# Patient Record
Sex: MALE | Race: BLACK OR AFRICAN AMERICAN | NOT HISPANIC OR LATINO | Employment: FULL TIME | URBAN - METROPOLITAN AREA
[De-identification: names, ages, dates, MRNs, and addresses within clinical notes are randomized per-mention and may not be internally consistent; named-entity substitution may affect disease eponyms.]

---

## 2017-08-12 ENCOUNTER — HOSPITAL ENCOUNTER (EMERGENCY)
Facility: HOSPITAL | Age: 37
Discharge: HOME | End: 2017-08-12
Attending: EMERGENCY MEDICINE

## 2017-08-12 VITALS
HEIGHT: 67 IN | SYSTOLIC BLOOD PRESSURE: 126 MMHG | RESPIRATION RATE: 16 BRPM | BODY MASS INDEX: 27.47 KG/M2 | WEIGHT: 175 LBS | TEMPERATURE: 98 F | DIASTOLIC BLOOD PRESSURE: 82 MMHG | OXYGEN SATURATION: 99 % | HEART RATE: 90 BPM

## 2017-08-12 DIAGNOSIS — R55 SYNCOPE, UNSPECIFIED SYNCOPE TYPE: ICD-10-CM

## 2017-08-12 DIAGNOSIS — R55 SYNCOPE: ICD-10-CM

## 2017-08-12 DIAGNOSIS — F43.21 GRIEF REACTION: Primary | ICD-10-CM

## 2017-08-12 PROCEDURE — 93005 ELECTROCARDIOGRAM TRACING: CPT

## 2017-08-12 PROCEDURE — 25000003 PHARM REV CODE 250: Performed by: EMERGENCY MEDICINE

## 2017-08-12 PROCEDURE — 99284 EMERGENCY DEPT VISIT MOD MDM: CPT

## 2017-08-12 RX ORDER — ALPRAZOLAM 0.25 MG/1
0.5 TABLET ORAL
Status: COMPLETED | OUTPATIENT
Start: 2017-08-12 | End: 2017-08-12

## 2017-08-12 RX ORDER — ALPRAZOLAM 0.5 MG/1
0.5 TABLET ORAL 3 TIMES DAILY PRN
Qty: 12 TABLET | Refills: 0 | Status: SHIPPED | OUTPATIENT
Start: 2017-08-12 | End: 2017-09-11

## 2017-08-12 RX ADMIN — ALPRAZOLAM 0.5 MG: 0.25 TABLET ORAL at 03:08

## 2017-08-12 NOTE — ED PROVIDER NOTES
"Encounter Date: 8/12/2017       History     Chief Complaint   Patient presents with    Anxiety     "passed out " after recieving news of brothers death     37-year-old healthy male presents to the emergency room after an episode of syncope today.  He had just been informed of his brother's unexpected death in a car accident when he fainted.  No other complaints.  No chest pain preceding this event.  Patient has no significant medical history.  Doesn't smoke doesn't drink.          Review of patient's allergies indicates:  No Known Allergies  History reviewed. No pertinent past medical history.  History reviewed. No pertinent surgical history.  History reviewed. No pertinent family history.  Social History   Substance Use Topics    Smoking status: Never Smoker    Smokeless tobacco: Not on file    Alcohol use Not on file     Review of Systems   Respiratory: Negative for shortness of breath.    Cardiovascular: Negative for chest pain.   Skin: Negative for rash.   Neurological: Positive for syncope. Negative for headaches.       Physical Exam     Initial Vitals [08/12/17 1519]   BP Pulse Resp Temp SpO2   129/83 93 20 98 °F (36.7 °C) 96 %      MAP       98.33         Physical Exam    Nursing note and vitals reviewed.  Constitutional: He appears well-developed and well-nourished. He is not diaphoretic.  Non-toxic appearance. He does not have a sickly appearance. He does not appear ill. No distress.   HENT:   Head: Normocephalic and atraumatic.   Eyes: EOM are normal.   Neck: Normal range of motion. Neck supple. Normal range of motion present. No neck rigidity.   Cardiovascular: Normal rate, regular rhythm and normal heart sounds.   Pulmonary/Chest: Breath sounds normal. No respiratory distress.   Abdominal: Soft. He exhibits no distension. There is no tenderness. There is no rebound.   Musculoskeletal: Normal range of motion.   Neurological: He is alert and oriented to person, place, and time.   Skin: Skin is warm and " dry. No rash noted.   Psychiatric: He has a normal mood and affect. His behavior is normal. Judgment and thought content normal.         ED Course   Procedures  Labs Reviewed - No data to display          Medical Decision Making:   Clinical Tests:   Medical Tests: Ordered and Reviewed                   ED Course   Comment By Time   37-year-old with no medical history presents after an episode of syncope when he was informed that his brother had unexpectedly .  Feels better after Xanax, EKG with no acute findings.  No labs are indicated.  Diagnosis is a grief reaction, patient can be discharged. Ahmet Pineda MD  [id number]     Clinical Impression:   The primary encounter diagnosis was Grief reaction. Diagnoses of Syncope and Syncope, unspecified syncope type were also pertinent to this visit.                           Ahmet Pineda MD  [phone]

## 2018-03-02 ENCOUNTER — CLINICAL SUPPORT (OUTPATIENT)
Dept: OCCUPATIONAL MEDICINE | Facility: CLINIC | Age: 38
End: 2018-03-02

## 2018-03-02 DIAGNOSIS — Z00.00 PHYSICAL EXAM: Primary | ICD-10-CM

## 2018-03-02 LAB
BILIRUB UR QL STRIP: NORMAL
CTP QC/QA: YES
GLUCOSE UR QL STRIP: NORMAL
KETONES UR QL STRIP: NORMAL
LEUKOCYTE ESTERASE UR QL STRIP: NORMAL
PH, POC UA: NORMAL (ref 5–8)
POC 10 PANEL DRUG SCREEN: NEGATIVE
POC BLOOD, URINE: NEGATIVE
POC NITRATES, URINE: NORMAL
PROT UR QL STRIP: NORMAL
SP GR UR STRIP: NORMAL (ref 1–1.03)
UROBILINOGEN UR STRIP-ACNC: NORMAL (ref 0.3–2.2)

## 2018-03-02 PROCEDURE — 99499 UNLISTED E&M SERVICE: CPT | Mod: S$GLB,,, | Performed by: PREVENTIVE MEDICINE

## 2018-03-02 PROCEDURE — 80305 DRUG TEST PRSMV DIR OPT OBS: CPT | Mod: QW,S$GLB,, | Performed by: PHYSICIAN ASSISTANT

## 2021-10-25 ENCOUNTER — OCCUPATIONAL HEALTH (OUTPATIENT)
Dept: URGENT CARE | Facility: CLINIC | Age: 41
End: 2021-10-25
Payer: OTHER MISCELLANEOUS

## 2021-10-25 DIAGNOSIS — Z02.1 PRE-EMPLOYMENT DRUG SCREENING: Primary | ICD-10-CM

## 2021-10-25 LAB
CTP QC/QA: YES
POC 10 PANEL DRUG SCREEN: NEGATIVE

## 2021-10-25 PROCEDURE — 80305 POCT RAPID DRUG SCREEN 10 PANEL: ICD-10-PCS | Mod: S$GLB,,, | Performed by: NURSE PRACTITIONER

## 2021-10-25 PROCEDURE — 80305 DRUG TEST PRSMV DIR OPT OBS: CPT | Mod: S$GLB,,, | Performed by: NURSE PRACTITIONER

## 2021-10-26 ENCOUNTER — OCCUPATIONAL HEALTH (OUTPATIENT)
Dept: URGENT CARE | Facility: CLINIC | Age: 41
End: 2021-10-26

## 2021-10-26 DIAGNOSIS — Z02.83 ENCOUNTER FOR DRUG SCREENING: Primary | ICD-10-CM

## 2021-10-26 LAB — BREATH ALCOHOL: 0

## 2021-10-26 PROCEDURE — 82075 POCT BREATH ALCOHOL TEST: ICD-10-PCS | Mod: S$GLB,,, | Performed by: NURSE PRACTITIONER

## 2021-10-26 PROCEDURE — 82075 ASSAY OF BREATH ETHANOL: CPT | Mod: S$GLB,,, | Performed by: NURSE PRACTITIONER

## 2025-06-08 ENCOUNTER — HOSPITAL ENCOUNTER (OUTPATIENT)
Facility: HOSPITAL | Age: 45
Discharge: HOME | End: 2025-06-10
Attending: HOSPITALIST | Admitting: HOSPITALIST
Payer: COMMERCIAL

## 2025-06-08 DIAGNOSIS — N17.9 AKI (ACUTE KIDNEY INJURY): ICD-10-CM

## 2025-06-08 DIAGNOSIS — R07.9 CHEST PAIN: ICD-10-CM

## 2025-06-08 PROBLEM — K21.9 GERD (GASTROESOPHAGEAL REFLUX DISEASE): Status: ACTIVE | Noted: 2025-06-08

## 2025-06-08 PROBLEM — I10 HYPERTENSION: Status: ACTIVE | Noted: 2025-06-08

## 2025-06-08 LAB
ABSOLUTE EOSINOPHIL (OHS): 0.01 K/UL
ABSOLUTE MONOCYTE (OHS): 0.74 K/UL (ref 0.3–1)
ABSOLUTE NEUTROPHIL COUNT (OHS): 2.76 K/UL (ref 1.8–7.7)
ALBUMIN SERPL BCP-MCNC: 3.9 G/DL (ref 3.5–5.2)
ALP SERPL-CCNC: 70 UNIT/L (ref 40–150)
ALT SERPL W/O P-5'-P-CCNC: 17 UNIT/L (ref 10–44)
ANION GAP (OHS): 13 MMOL/L (ref 8–16)
AST SERPL-CCNC: 24 UNIT/L (ref 11–45)
BASOPHILS # BLD AUTO: 0.02 K/UL
BASOPHILS NFR BLD AUTO: 0.4 %
BILIRUB SERPL-MCNC: 0.6 MG/DL (ref 0.1–1)
BUN SERPL-MCNC: 37 MG/DL (ref 6–20)
CALCIUM SERPL-MCNC: 8.9 MG/DL (ref 8.7–10.5)
CHLORIDE SERPL-SCNC: 100 MMOL/L (ref 95–110)
CK SERPL-CCNC: 300 U/L (ref 20–200)
CO2 SERPL-SCNC: 25 MMOL/L (ref 23–29)
CREAT SERPL-MCNC: 2.8 MG/DL (ref 0.5–1.4)
ERYTHROCYTE [DISTWIDTH] IN BLOOD BY AUTOMATED COUNT: 15 % (ref 11.5–14.5)
GFR SERPLBLD CREATININE-BSD FMLA CKD-EPI: 28 ML/MIN/1.73/M2
GLUCOSE SERPL-MCNC: 93 MG/DL (ref 70–110)
HCT VFR BLD AUTO: 47.3 % (ref 40–54)
HGB BLD-MCNC: 15.3 GM/DL (ref 14–18)
IMM GRANULOCYTES # BLD AUTO: 0.01 K/UL (ref 0–0.04)
IMM GRANULOCYTES NFR BLD AUTO: 0.2 % (ref 0–0.5)
LYMPHOCYTES # BLD AUTO: 1.19 K/UL (ref 1–4.8)
MCH RBC QN AUTO: 26.1 PG (ref 27–31)
MCHC RBC AUTO-ENTMCNC: 32.3 G/DL (ref 32–36)
MCV RBC AUTO: 81 FL (ref 82–98)
NUCLEATED RBC (/100WBC) (OHS): 0 /100 WBC
PLATELET # BLD AUTO: 176 K/UL (ref 150–450)
PMV BLD AUTO: 10.7 FL (ref 9.2–12.9)
POTASSIUM SERPL-SCNC: 3.8 MMOL/L (ref 3.5–5.1)
PROT SERPL-MCNC: 7.5 GM/DL (ref 6–8.4)
RBC # BLD AUTO: 5.86 M/UL (ref 4.6–6.2)
RELATIVE EOSINOPHIL (OHS): 0.2 %
RELATIVE LYMPHOCYTE (OHS): 25.2 % (ref 18–48)
RELATIVE MONOCYTE (OHS): 15.6 % (ref 4–15)
RELATIVE NEUTROPHIL (OHS): 58.4 % (ref 38–73)
SODIUM SERPL-SCNC: 138 MMOL/L (ref 136–145)
WBC # BLD AUTO: 4.73 K/UL (ref 3.9–12.7)

## 2025-06-08 PROCEDURE — G0378 HOSPITAL OBSERVATION PER HR: HCPCS

## 2025-06-08 PROCEDURE — G0379 DIRECT REFER HOSPITAL OBSERV: HCPCS

## 2025-06-08 PROCEDURE — 96361 HYDRATE IV INFUSION ADD-ON: CPT

## 2025-06-08 PROCEDURE — 85025 COMPLETE CBC W/AUTO DIFF WBC: CPT

## 2025-06-08 PROCEDURE — 63600175 PHARM REV CODE 636 W HCPCS

## 2025-06-08 PROCEDURE — 96360 HYDRATION IV INFUSION INIT: CPT

## 2025-06-08 PROCEDURE — 82550 ASSAY OF CK (CPK): CPT

## 2025-06-08 PROCEDURE — 25000003 PHARM REV CODE 250

## 2025-06-08 PROCEDURE — 80053 COMPREHEN METABOLIC PANEL: CPT

## 2025-06-08 RX ORDER — NALOXONE HCL 0.4 MG/ML
0.02 VIAL (ML) INJECTION
Status: DISCONTINUED | OUTPATIENT
Start: 2025-06-08 | End: 2025-06-10 | Stop reason: HOSPADM

## 2025-06-08 RX ORDER — SODIUM CHLORIDE, SODIUM LACTATE, POTASSIUM CHLORIDE, CALCIUM CHLORIDE 600; 310; 30; 20 MG/100ML; MG/100ML; MG/100ML; MG/100ML
INJECTION, SOLUTION INTRAVENOUS CONTINUOUS
Status: ACTIVE | OUTPATIENT
Start: 2025-06-08 | End: 2025-06-08

## 2025-06-08 RX ORDER — IBUPROFEN 200 MG
24 TABLET ORAL
Status: DISCONTINUED | OUTPATIENT
Start: 2025-06-08 | End: 2025-06-10 | Stop reason: HOSPADM

## 2025-06-08 RX ORDER — ONDANSETRON 8 MG/1
8 TABLET, ORALLY DISINTEGRATING ORAL EVERY 8 HOURS PRN
Status: DISCONTINUED | OUTPATIENT
Start: 2025-06-08 | End: 2025-06-10 | Stop reason: HOSPADM

## 2025-06-08 RX ORDER — AMLODIPINE BESYLATE 10 MG/1
10 TABLET ORAL DAILY
Status: DISCONTINUED | OUTPATIENT
Start: 2025-06-08 | End: 2025-06-10 | Stop reason: HOSPADM

## 2025-06-08 RX ORDER — TALC
6 POWDER (GRAM) TOPICAL NIGHTLY PRN
Status: DISCONTINUED | OUTPATIENT
Start: 2025-06-08 | End: 2025-06-10 | Stop reason: HOSPADM

## 2025-06-08 RX ORDER — PANTOPRAZOLE SODIUM 20 MG/1
20 TABLET, DELAYED RELEASE ORAL EVERY MORNING
COMMUNITY
Start: 2025-06-06 | End: 2026-06-06

## 2025-06-08 RX ORDER — OXYCODONE HYDROCHLORIDE 5 MG/1
5 TABLET ORAL EVERY 6 HOURS PRN
Refills: 0 | Status: DISCONTINUED | OUTPATIENT
Start: 2025-06-08 | End: 2025-06-10 | Stop reason: HOSPADM

## 2025-06-08 RX ORDER — PROCHLORPERAZINE EDISYLATE 5 MG/ML
5 INJECTION INTRAMUSCULAR; INTRAVENOUS EVERY 6 HOURS PRN
Status: DISCONTINUED | OUTPATIENT
Start: 2025-06-08 | End: 2025-06-10 | Stop reason: HOSPADM

## 2025-06-08 RX ORDER — ACETAMINOPHEN 500 MG
1000 TABLET ORAL EVERY 8 HOURS PRN
Status: DISCONTINUED | OUTPATIENT
Start: 2025-06-08 | End: 2025-06-09

## 2025-06-08 RX ORDER — GLUCAGON 1 MG
1 KIT INJECTION
Status: DISCONTINUED | OUTPATIENT
Start: 2025-06-08 | End: 2025-06-10 | Stop reason: HOSPADM

## 2025-06-08 RX ORDER — POLYETHYLENE GLYCOL 3350 17 G/17G
17 POWDER, FOR SOLUTION ORAL DAILY PRN
Status: DISCONTINUED | OUTPATIENT
Start: 2025-06-08 | End: 2025-06-10 | Stop reason: HOSPADM

## 2025-06-08 RX ORDER — IBUPROFEN 200 MG
16 TABLET ORAL
Status: DISCONTINUED | OUTPATIENT
Start: 2025-06-08 | End: 2025-06-10 | Stop reason: HOSPADM

## 2025-06-08 RX ORDER — SODIUM CHLORIDE 0.9 % (FLUSH) 0.9 %
10 SYRINGE (ML) INJECTION EVERY 12 HOURS PRN
Status: DISCONTINUED | OUTPATIENT
Start: 2025-06-08 | End: 2025-06-10 | Stop reason: HOSPADM

## 2025-06-08 RX ORDER — LOSARTAN POTASSIUM AND HYDROCHLOROTHIAZIDE 25; 100 MG/1; MG/1
1 TABLET ORAL DAILY
COMMUNITY

## 2025-06-08 RX ORDER — ACETAMINOPHEN 325 MG/1
650 TABLET ORAL EVERY 4 HOURS PRN
Status: DISCONTINUED | OUTPATIENT
Start: 2025-06-08 | End: 2025-06-10 | Stop reason: HOSPADM

## 2025-06-08 RX ORDER — LIDOCAINE 50 MG/G
2 PATCH TOPICAL
Status: DISCONTINUED | OUTPATIENT
Start: 2025-06-08 | End: 2025-06-10 | Stop reason: HOSPADM

## 2025-06-08 RX ORDER — AMLODIPINE BESYLATE 10 MG/1
10 TABLET ORAL DAILY
COMMUNITY

## 2025-06-08 RX ADMIN — LIDOCAINE 2 PATCH: 50 PATCH CUTANEOUS at 10:06

## 2025-06-08 RX ADMIN — AMLODIPINE BESYLATE 10 MG: 10 TABLET ORAL at 10:06

## 2025-06-08 RX ADMIN — ACETAMINOPHEN 650 MG: 325 TABLET ORAL at 10:06

## 2025-06-08 RX ADMIN — OXYCODONE 5 MG: 5 TABLET ORAL at 09:06

## 2025-06-08 RX ADMIN — SODIUM CHLORIDE, POTASSIUM CHLORIDE, SODIUM LACTATE AND CALCIUM CHLORIDE: 600; 310; 30; 20 INJECTION, SOLUTION INTRAVENOUS at 10:06

## 2025-06-08 RX ADMIN — OXYCODONE 5 MG: 5 TABLET ORAL at 01:06

## 2025-06-08 RX ADMIN — Medication 6 MG: at 09:06

## 2025-06-08 RX ADMIN — ONDANSETRON 8 MG: 8 TABLET, ORALLY DISINTEGRATING ORAL at 09:06

## 2025-06-08 NOTE — PLAN OF CARE
[facility] - Med Surg  Initial Discharge Assessment       Primary Care Provider: Renata, Primary Doctor    Admission Diagnosis: FUNMILAYO (acute kidney injury) [N17.9]    Admission Date: 6/8/2025  Expected Discharge Date:     Transition of Care Barriers: None    Payor: /     Extended Emergency Contact Information  Primary Emergency Contact: Monroe Wang   [facility]  Home Phone: [phone]  Mobile Phone: [phone]  Relation: Brother    Discharge Plan A: Home  Discharge Plan B: Home      [id number] - [place], [place] - [address] AT [address]  [address]  [facility] [address]  Phone: [phone] Fax: [phone]      Initial Assessment (most recent)       Adult Discharge Assessment - 06/08/25 1344          Discharge Assessment    Assessment Type Discharge Planning Assessment     Confirmed/corrected address, phone number and insurance Yes     Confirmed Demographics Correct on Facesheet     Source of Information patient     Communicated LU with patient/caregiver Date not available/Unable to determine     People in Home alone     Facility Arrived From: [facility]     Do you expect to return to your current living situation? Yes     Do you have help at home or someone to help you manage your care at home? No     Prior to hospitilization cognitive status: Alert/Oriented     Current cognitive status: Alert/Oriented     Walking or Climbing Stairs Difficulty no     Dressing/Bathing Difficulty no     Home Accessibility wheelchair accessible     Home Layout Able to live on 1st floor     Equipment Currently Used at Home none     Readmission within 30 days? No     Patient currently being followed by outpatient case management? No     Do you currently have service(s) that help you manage your care at home? No     Do you take prescription medications? Yes     Do you have prescription coverage? Yes     Do you have any problems affording any of your  prescribed medications? TBD     Who is going to help you get home at discharge? family     How do you get to doctors appointments? car, drives self;family or friend will provide     Are you on dialysis? No     Do you take coumadin? No     Discharge Plan A Home     Discharge Plan B Home     DME Needed Upon Discharge  none     Discharge Plan discussed with: Patient     Transition of Care Barriers None        Physical Activity    On average, how many days per week do you engage in moderate to strenuous exercise (like a brisk walk)? 5 days        Financial Resource Strain    How hard is it for you to pay for the very basics like food, housing, medical care, and heating? Somewhat hard        Housing Stability    In the last 12 months, was there a time when you were not able to pay the mortgage or rent on time? No     At any time in the past 12 months, were you homeless or living in a shelter (including now)? No        Transportation Needs    In the past 12 months, has lack of transportation kept you from medical appointments or from getting medications? No     In the past 12 months, has lack of transportation kept you from meetings, work, or from getting things needed for daily living? No        Food Insecurity    Within the past 12 months, you worried that your food would run out before you got the money to buy more. Never true     Within the past 12 months, the food you bought just didn't last and you didn't have money to get more. Never true        Stress    Do you feel stress - tense, restless, nervous, or anxious, or unable to sleep at night because your mind is troubled all the time - these days? Only a little        Social Isolation    How often do you feel lonely or isolated from those around you?  Rarely        Alcohol Use    Q2: How many drinks containing alcohol do you have on a typical day when you are drinking? Patient does not drink        Health Literacy    How often do you need to have someone help you  when you read instructions, pamphlets, or other written material from your doctor or pharmacy? Rarely                        Discharge Plan A and Plan B have been determined by review of patient's clinical status, future medical and therapeutic needs, and coverage/benefits for post-acute care in coordination with multidisciplinary team members.     CM spoke with patient in Room at bedside. All information was verified on facesheet. Patient lives in a 1 story house alone. Patient states no assistance is needed. Patient can ambulate, drive, run errands, and complete ADL's independently. Patient does not use any DME or any in-home assistive equipment. Patient has not used Home Health previously. Patient is not on dialysis nor use Coumadin as a blood thinner. Patient takes medication as prescribed and has resources for all prescriptive needs. Patient will have help from family member upon discharge. Family will provide transportation home. All Questions and concerns addressed and whiteboard updated with CM contact information. Will continue to follow for course of hospitalization.     Shante Hamilton RN  Case Management  [phone]

## 2025-06-08 NOTE — H&P
[facility]  History & Physical    Patient Name: Samantha Wang  MRN: [id number]  Patient Class: OP- Observation  Admission Date: 6/8/2025  Attending Physician: Luis Morales DO   Primary Care Provider: Renata, Primary Doctor         Patient information was obtained from patient, past medical records, and ER records.     Subjective:     Principal Problem:FUNMILAYO (acute kidney injury)    Chief Complaint: No chief complaint on file.       HPI: Samantha Wang is a 45 y.o.M with no significant PMH. He presented with weakness, fever, malaise, nausea, vomiting, and abdominal pain. Patient works outside in the heat and has been trying to keep hydrated. He was seen on [facility] [place] over the past two days after presenting with chest pain. Work up was largely negative and patient reported feeling improved and was discharged. He is concerned that something else may be going on. Tmax in the ER was 100. Work up with CT showed thickening of the urinary bladder. DDD causing stenosis of L5-S1.  Creatinine noted to be at 4. Cr at baseline 2.7 in 2022 and recently 1.6 on 6/5/25. Patient receiving IV hydration. Urine negative for UTI. Transferring for management of FUNMILAYO.     On arrival to [facility] [place], patient states he has been feeling very week for the last two days with associated vomiting since Thursday. Patient states he hasn't vomited since yesterday and was able to keep some cereal down earlier this morning. He is also endorsing low back pain. 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 He tries to stay hydrated most of the time but admits it can be difficult. Currently denies fever, chills, chest pain, palpitations, SOB, cough, dysuria, headaches, or any other symptoms at this time.       No past medical history on file.    No past surgical history on file.    Review of patient's allergies indicates:  No Known Allergies    Current Facility-Administered Medications on File Prior  to Encounter   Medication    [COMPLETED] acetaminophen tablet 1,000 mg    [COMPLETED] iohexoL (OMNIPAQUE 350) injection 100 mL    [COMPLETED] ondansetron injection 4 mg    [COMPLETED] sodium chloride 0.9% bolus 1,000 mL 1,000 mL    [COMPLETED] sodium chloride 0.9% bolus 1,000 mL 1,000 mL     Current Outpatient Medications on File Prior to Encounter   Medication Sig    pantoprazole (PROTONIX) 20 MG tablet Take 20 mg by mouth every morning.    alprazolam (XANAX) 0.5 MG tablet Take 1 tablet (0.5 mg total) by mouth 3 (three) times daily as needed for Anxiety.    amLODIPine (NORVASC) 10 MG tablet Take 10 mg by mouth once daily.    ketorolac (TORADOL) 10 mg tablet Take 1 tablet (10 mg total) by mouth every 6 (six) hours as needed for Pain.    lidocaine (LIDODERM) 5 % Place 1 patch onto the skin once daily. Remove & Discard patch within 12 hours or as directed by MD    losartan-hydrochlorothiazide 100-25 mg (HYZAAR) 100-25 mg per tablet Take 1 tablet by mouth once daily.     Family History    None       Tobacco Use    Smoking status: Never    Smokeless tobacco: Not on file   Vaping Use    Vaping status: Never Used   Substance and Sexual Activity    Alcohol use: Not Currently    Drug use: Not Currently    Sexual activity: Yes     Review of Systems   Constitutional:  Positive for fatigue. Negative for activity change, chills and fever.   HENT:  Negative for trouble swallowing.    Eyes:  Negative for photophobia and visual disturbance.   Respiratory:  Negative for chest tightness, shortness of breath and wheezing.    Cardiovascular:  Negative for chest pain, palpitations and leg swelling.   Gastrointestinal:  Positive for vomiting. Negative for abdominal pain, constipation and diarrhea.   Genitourinary:  Negative for dysuria, frequency, hematuria and urgency.   Musculoskeletal:  Positive for back pain. Negative for arthralgias and gait problem.   Skin:  Negative for color change and rash.   Neurological:  Positive for  weakness. Negative for dizziness, syncope, light-headedness, numbness and headaches.   Psychiatric/Behavioral:  Negative for agitation and confusion. The patient is not nervous/anxious.      Objective:     Vital Signs (Most Recent):  Temp: 99.5 °F (37.5 °C) (06/08/25 0818)  Pulse: 92 (06/08/25 0818)  Resp: 16 (06/08/25 0818)  BP: (!) 141/82 (06/08/25 0818)  SpO2: 95 % (06/08/25 0818) Vital Signs (24h Range):  Temp:  [98.9 °F (37.2 °C)-100.1 °F (37.8 °C)] 99.5 °F (37.5 °C)  Pulse:  [] 92  Resp:  [16-22] 16  SpO2:  [95 %-99 %] 95 %  BP: (112-156)/(62-87) 141/82        There is no height or weight on file to calculate BMI.     Physical Exam  Vitals and nursing note reviewed.   Constitutional:       General: He is not in acute distress.     Appearance: He is well-developed.   HENT:      Head: Normocephalic and atraumatic.      Mouth/Throat:      Pharynx: No oropharyngeal exudate.   Eyes:      Extraocular Movements: Extraocular movements intact.      Conjunctiva/sclera: Conjunctivae normal.   Cardiovascular:      Rate and Rhythm: Normal rate and regular rhythm.      Heart sounds: Normal heart sounds.   Pulmonary:      Effort: Pulmonary effort is normal. No respiratory distress.      Breath sounds: Normal breath sounds. No wheezing.   Abdominal:      General: Bowel sounds are normal. There is no distension.      Palpations: Abdomen is soft.      Tenderness: There is no abdominal tenderness.   Musculoskeletal:         General: Tenderness (low back) present. Normal range of motion.      Cervical back: Normal range of motion and neck supple.   Lymphadenopathy:      Cervical: No cervical adenopathy.   Skin:     General: Skin is warm and dry.      Capillary Refill: Capillary refill takes less than 2 seconds.      Findings: No rash.   Neurological:      Mental Status: He is alert and oriented to person, place, and time.      Cranial Nerves: No cranial nerve deficit.      Sensory: No sensory deficit.      Coordination:  Coordination normal.   Psychiatric:         Behavior: Behavior normal.         Thought Content: Thought content normal.         Judgment: Judgment normal.                Significant Labs: All pertinent labs within the past 24 hours have been reviewed.    Bilirubin:   Recent Labs   Lab 06/07/25 1810   BILITOT 0.7     BMP:   Recent Labs   Lab 06/07/25  1810   *      K 3.9   CL 99   CO2 21*   BUN 43*   CREATININE 4.0*   CALCIUM 8.8     CBC:   Recent Labs   Lab 06/07/25 1810   WBC 5.79   HGB 14.9   HCT 43.8        CMP:   Recent Labs   Lab 06/07/25  1810      K 3.9   CL 99   CO2 21*   *   BUN 43*   CREATININE 4.0*   CALCIUM 8.8   PROT 8.1   ALBUMIN 4.1   BILITOT 0.7   ALKPHOS 67   AST 36   ALT 19   ANIONGAP 16     Lipase:   Recent Labs   Lab 06/07/25 1810   LIPASE 20     Urine Studies:   Recent Labs   Lab 06/07/25 2010   COLORU Yellow   APPEARANCEUA Clear   PHUR 6.0   SPECGRAV 1.025   PROTEINUA Negative   GLUCUA Negative   BILIRUBINUA Negative   OCCULTUA Negative   NITRITE Negative   UROBILINOGEN Negative   LEUKOCYTESUR Negative       Significant Imaging: I have reviewed all pertinent imaging results/findings within the past 24 hours.      Assessment/Plan:     Assessment & Plan  FUNMILAYO (acute kidney injury)  FUNMILAYO is likely due to pre-renal azotemia due to dehydration and vomiting. Baseline creatinine is ~2.7 in 2022, most recently Cr 1.6 on 6/5/25. Most recent creatinine and eGFR are listed below.  Recent Labs     06/07/25 1810   CREATININE 4.0*   EGFRNORACEVR 18*      Plan  - FUNMILAYO is stable  - Avoid nephrotoxins and renally dose meds for GFR listed above  - Monitor urine output, serial BMP, and adjust therapy as needed  -    - UA non-infectious appearing  - will continue IV hydration and encourage oral hydration  - prn antiemetics for nausea  Hypertension  Patient's blood pressure range in the last 24 hours was: BP  Min: 112/62  Max: 156/87.The patient's inpatient  anti-hypertensive regimen is listed below:  Current Antihypertensives  , Daily, Oral  , Daily, Oral  amLODIPine tablet 10 mg, Daily, Oral    Plan  - BP is controlled, no changes needed to their regimen  - patient also on losartan-HCTZ 100-25mg  - can add on losartan as needed for elevated BP, would hold on HCTZ given current dehydration      VTE Risk Mitigation (From admission, onward)           Ordered     IP VTE LOW RISK PATIENT  Once         06/08/25 0829     Place sequential compression device  Until discontinued         06/08/25 0829                         On 06/08/2025, patient should be placed in hospital observation services under my care in collaboration with Dr. Luis Morales.           Stephanie Garcia PA-C  Department of Hospital Medicine  [facility]

## 2025-06-08 NOTE — SUBJECTIVE & OBJECTIVE
No past medical history on file.    No past surgical history on file.    Review of patient's allergies indicates:  No Known Allergies    Current Facility-Administered Medications on File Prior to Encounter   Medication    [COMPLETED] acetaminophen tablet 1,000 mg    [COMPLETED] iohexoL (OMNIPAQUE 350) injection 100 mL    [COMPLETED] ondansetron injection 4 mg    [COMPLETED] sodium chloride 0.9% bolus 1,000 mL 1,000 mL    [COMPLETED] sodium chloride 0.9% bolus 1,000 mL 1,000 mL     Current Outpatient Medications on File Prior to Encounter   Medication Sig    pantoprazole (PROTONIX) 20 MG tablet Take 20 mg by mouth every morning.    alprazolam (XANAX) 0.5 MG tablet Take 1 tablet (0.5 mg total) by mouth 3 (three) times daily as needed for Anxiety.    amLODIPine (NORVASC) 10 MG tablet Take 10 mg by mouth once daily.    ketorolac (TORADOL) 10 mg tablet Take 1 tablet (10 mg total) by mouth every 6 (six) hours as needed for Pain.    lidocaine (LIDODERM) 5 % Place 1 patch onto the skin once daily. Remove & Discard patch within 12 hours or as directed by MD    losartan-hydrochlorothiazide 100-25 mg (HYZAAR) 100-25 mg per tablet Take 1 tablet by mouth once daily.     Family History    None       Tobacco Use    Smoking status: Never    Smokeless tobacco: Not on file   Vaping Use    Vaping status: Never Used   Substance and Sexual Activity    Alcohol use: Not Currently    Drug use: Not Currently    Sexual activity: Yes     Review of Systems   Constitutional:  Positive for fatigue. Negative for activity change, chills and fever.   HENT:  Negative for trouble swallowing.    Eyes:  Negative for photophobia and visual disturbance.   Respiratory:  Negative for chest tightness, shortness of breath and wheezing.    Cardiovascular:  Negative for chest pain, palpitations and leg swelling.   Gastrointestinal:  Positive for vomiting. Negative for abdominal pain, constipation and diarrhea.   Genitourinary:  Negative for dysuria, frequency,  hematuria and urgency.   Musculoskeletal:  Positive for back pain. Negative for arthralgias and gait problem.   Skin:  Negative for color change and rash.   Neurological:  Positive for weakness. Negative for dizziness, syncope, light-headedness, numbness and headaches.   Psychiatric/Behavioral:  Negative for agitation and confusion. The patient is not nervous/anxious.      Objective:     Vital Signs (Most Recent):  Temp: 99.5 °F (37.5 °C) (06/08/25 0818)  Pulse: 92 (06/08/25 0818)  Resp: 16 (06/08/25 0818)  BP: (!) 141/82 (06/08/25 0818)  SpO2: 95 % (06/08/25 0818) Vital Signs (24h Range):  Temp:  [98.9 °F (37.2 °C)-100.1 °F (37.8 °C)] 99.5 °F (37.5 °C)  Pulse:  [] 92  Resp:  [16-22] 16  SpO2:  [95 %-99 %] 95 %  BP: (112-156)/(62-87) 141/82        There is no height or weight on file to calculate BMI.     Physical Exam  Vitals and nursing note reviewed.   Constitutional:       General: He is not in acute distress.     Appearance: He is well-developed.   HENT:      Head: Normocephalic and atraumatic.      Mouth/Throat:      Pharynx: No oropharyngeal exudate.   Eyes:      Extraocular Movements: Extraocular movements intact.      Conjunctiva/sclera: Conjunctivae normal.   Cardiovascular:      Rate and Rhythm: Normal rate and regular rhythm.      Heart sounds: Normal heart sounds.   Pulmonary:      Effort: Pulmonary effort is normal. No respiratory distress.      Breath sounds: Normal breath sounds. No wheezing.   Abdominal:      General: Bowel sounds are normal. There is no distension.      Palpations: Abdomen is soft.      Tenderness: There is no abdominal tenderness.   Musculoskeletal:         General: Tenderness (low back) present. Normal range of motion.      Cervical back: Normal range of motion and neck supple.   Lymphadenopathy:      Cervical: No cervical adenopathy.   Skin:     General: Skin is warm and dry.      Capillary Refill: Capillary refill takes less than 2 seconds.      Findings: No rash.    Neurological:      Mental Status: He is alert and oriented to person, place, and time.      Cranial Nerves: No cranial nerve deficit.      Sensory: No sensory deficit.      Coordination: Coordination normal.   Psychiatric:         Behavior: Behavior normal.         Thought Content: Thought content normal.         Judgment: Judgment normal.                Significant Labs: All pertinent labs within the past 24 hours have been reviewed.    Bilirubin:   Recent Labs   Lab 06/07/25  1810   BILITOT 0.7     BMP:   Recent Labs   Lab 06/07/25  1810   *      K 3.9   CL 99   CO2 21*   BUN 43*   CREATININE 4.0*   CALCIUM 8.8     CBC:   Recent Labs   Lab 06/07/25  1810   WBC 5.79   HGB 14.9   HCT 43.8        CMP:   Recent Labs   Lab 06/07/25  1810      K 3.9   CL 99   CO2 21*   *   BUN 43*   CREATININE 4.0*   CALCIUM 8.8   PROT 8.1   ALBUMIN 4.1   BILITOT 0.7   ALKPHOS 67   AST 36   ALT 19   ANIONGAP 16     Lipase:   Recent Labs   Lab 06/07/25  1810   LIPASE 20     Urine Studies:   Recent Labs   Lab 06/07/25  2010   COLORU Yellow   APPEARANCEUA Clear   PHUR 6.0   SPECGRAV 1.025   PROTEINUA Negative   GLUCUA Negative   BILIRUBINUA Negative   OCCULTUA Negative   NITRITE Negative   UROBILINOGEN Negative   LEUKOCYTESUR Negative       Significant Imaging: I have reviewed all pertinent imaging results/findings within the past 24 hours.

## 2025-06-08 NOTE — ASSESSMENT & PLAN NOTE
Patient's blood pressure range in the last 24 hours was: BP  Min: 112/62  Max: 156/87.The patient's inpatient anti-hypertensive regimen is listed below:  Current Antihypertensives  , Daily, Oral  , Daily, Oral  amLODIPine tablet 10 mg, Daily, Oral    Plan  - BP is controlled, no changes needed to their regimen  - patient also on losartan-HCTZ 100-25mg  - can add on losartan as needed for elevated BP, would hold on HCTZ given current dehydration

## 2025-06-08 NOTE — HPI
Samantha Wang is a 45 y.o.M with no significant PMH. He presented with weakness, fever, malaise, nausea, vomiting, and abdominal pain. Patient works outside in the heat and has been trying to keep hydrated. He was seen on [facility] [place] [place] over the past two days after presenting with chest pain. Work up was largely negative and patient reported feeling improved and was discharged. He is concerned that something else may be going on. Tmax in the ER was 100. Work up with CT showed thickening of the urinary bladder. DDD causing stenosis of L5-S1.  Creatinine noted to be at 4. Cr at baseline 2.7 in 2022 and recently 1.6 on 6/5/25. Patient receiving IV hydration. Urine negative for UTI. Transferring for management of FUNMILAYO.     On arrival to [facility] [place], patient states he has been feeling very week for the last two days with associated vomiting since Thursday. Patient states he hasn't vomited since yesterday and was able to keep some cereal down earlier this morning. He is also endorsing low back pain. 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 He tries to stay hydrated most of the time but admits it can be difficult. Currently denies fever, chills, chest pain, palpitations, SOB, cough, dysuria, headaches, or any other symptoms at this time.

## 2025-06-08 NOTE — ASSESSMENT & PLAN NOTE
FUNMILAYO is likely due to pre-renal azotemia due to dehydration and vomiting. Baseline creatinine is ~2.7 in 2022, most recently Cr 1.6 on 6/5/25. Most recent creatinine and eGFR are listed below.  Recent Labs     06/07/25  1810   CREATININE 4.0*   EGFRNORACEVR 18*      Plan  - FUNMILAYO is stable  - Avoid nephrotoxins and renally dose meds for GFR listed above  - Monitor urine output, serial BMP, and adjust therapy as needed  -    - UA non-infectious appearing  - will continue IV hydration and encourage oral hydration  - prn antiemetics for nausea

## 2025-06-09 PROBLEM — R50.9 FEVER: Status: ACTIVE | Noted: 2025-06-09

## 2025-06-09 PROBLEM — R42 DIZZINESS: Status: ACTIVE | Noted: 2025-06-09

## 2025-06-09 LAB
ABSOLUTE EOSINOPHIL (OHS): 0 K/UL
ABSOLUTE MONOCYTE (OHS): 0.59 K/UL (ref 0.3–1)
ABSOLUTE NEUTROPHIL COUNT (OHS): 3.37 K/UL (ref 1.8–7.7)
ALBUMIN SERPL BCP-MCNC: 3.9 G/DL (ref 3.5–5.2)
ALP SERPL-CCNC: 64 UNIT/L (ref 40–150)
ALT SERPL W/O P-5'-P-CCNC: 19 UNIT/L (ref 10–44)
ANION GAP (OHS): 14 MMOL/L (ref 8–16)
AST SERPL-CCNC: 25 UNIT/L (ref 11–45)
BASOPHILS # BLD AUTO: 0.01 K/UL
BASOPHILS NFR BLD AUTO: 0.2 %
BILIRUB SERPL-MCNC: 0.5 MG/DL (ref 0.1–1)
BILIRUB UR QL STRIP.AUTO: NEGATIVE
BUN SERPL-MCNC: 27 MG/DL (ref 6–20)
CALCIUM SERPL-MCNC: 9.3 MG/DL (ref 8.7–10.5)
CHLORIDE SERPL-SCNC: 98 MMOL/L (ref 95–110)
CLARITY UR: CLEAR
CO2 SERPL-SCNC: 23 MMOL/L (ref 23–29)
COLOR UR AUTO: YELLOW
CREAT SERPL-MCNC: 1.9 MG/DL (ref 0.5–1.4)
ERYTHROCYTE [DISTWIDTH] IN BLOOD BY AUTOMATED COUNT: 14.7 % (ref 11.5–14.5)
GFR SERPLBLD CREATININE-BSD FMLA CKD-EPI: 44 ML/MIN/1.73/M2
GLUCOSE SERPL-MCNC: 109 MG/DL (ref 70–110)
GLUCOSE UR QL STRIP: NEGATIVE
HAV IGM SERPL QL IA: NORMAL
HBV CORE IGM SERPL QL IA: NORMAL
HBV SURFACE AG SERPL QL IA: NORMAL
HCT VFR BLD AUTO: 42.7 % (ref 40–54)
HCV AB SERPL QL IA: NORMAL
HGB BLD-MCNC: 14.1 GM/DL (ref 14–18)
HGB UR QL STRIP: NEGATIVE
HIV 1+2 AB+HIV1 P24 AG SERPL QL IA: NORMAL
HOLD SPECIMEN: NORMAL
IMM GRANULOCYTES # BLD AUTO: 0.01 K/UL (ref 0–0.04)
IMM GRANULOCYTES NFR BLD AUTO: 0.2 % (ref 0–0.5)
KETONES UR QL STRIP: NEGATIVE
LACTATE SERPL-SCNC: 0.6 MMOL/L (ref 0.5–2.2)
LEUKOCYTE ESTERASE UR QL STRIP: NEGATIVE
LYMPHOCYTES # BLD AUTO: 0.87 K/UL (ref 1–4.8)
MCH RBC QN AUTO: 26.3 PG (ref 27–31)
MCHC RBC AUTO-ENTMCNC: 33 G/DL (ref 32–36)
MCV RBC AUTO: 80 FL (ref 82–98)
NITRITE UR QL STRIP: NEGATIVE
NUCLEATED RBC (/100WBC) (OHS): 0 /100 WBC
PH UR STRIP: 6 [PH]
PLATELET # BLD AUTO: 179 K/UL (ref 150–450)
PMV BLD AUTO: 10.9 FL (ref 9.2–12.9)
POTASSIUM SERPL-SCNC: 3.9 MMOL/L (ref 3.5–5.1)
PROT SERPL-MCNC: 7.8 GM/DL (ref 6–8.4)
PROT UR QL STRIP: NEGATIVE
RBC # BLD AUTO: 5.37 M/UL (ref 4.6–6.2)
RELATIVE EOSINOPHIL (OHS): 0 %
RELATIVE LYMPHOCYTE (OHS): 17.9 % (ref 18–48)
RELATIVE MONOCYTE (OHS): 12.2 % (ref 4–15)
RELATIVE NEUTROPHIL (OHS): 69.5 % (ref 38–73)
SODIUM SERPL-SCNC: 135 MMOL/L (ref 136–145)
SP GR UR STRIP: 1.01
UROBILINOGEN UR STRIP-ACNC: NEGATIVE EU/DL
WBC # BLD AUTO: 4.85 K/UL (ref 3.9–12.7)

## 2025-06-09 PROCEDURE — 25000003 PHARM REV CODE 250

## 2025-06-09 PROCEDURE — 83605 ASSAY OF LACTIC ACID: CPT | Performed by: HOSPITALIST

## 2025-06-09 PROCEDURE — 85025 COMPLETE CBC W/AUTO DIFF WBC: CPT | Performed by: HOSPITALIST

## 2025-06-09 PROCEDURE — 25000003 PHARM REV CODE 250: Performed by: HOSPITALIST

## 2025-06-09 PROCEDURE — 87040 BLOOD CULTURE FOR BACTERIA: CPT | Mod: 91 | Performed by: HOSPITALIST

## 2025-06-09 PROCEDURE — 36415 COLL VENOUS BLD VENIPUNCTURE: CPT | Performed by: HOSPITALIST

## 2025-06-09 PROCEDURE — 81003 URINALYSIS AUTO W/O SCOPE: CPT | Performed by: HOSPITALIST

## 2025-06-09 PROCEDURE — 87389 HIV-1 AG W/HIV-1&-2 AB AG IA: CPT

## 2025-06-09 PROCEDURE — G0378 HOSPITAL OBSERVATION PER HR: HCPCS

## 2025-06-09 PROCEDURE — 80074 ACUTE HEPATITIS PANEL: CPT

## 2025-06-09 PROCEDURE — 36415 COLL VENOUS BLD VENIPUNCTURE: CPT

## 2025-06-09 PROCEDURE — 80053 COMPREHEN METABOLIC PANEL: CPT | Performed by: HOSPITALIST

## 2025-06-09 RX ORDER — NAPROXEN 500 MG/1
500 TABLET ORAL 2 TIMES DAILY
COMMUNITY
Start: 2025-06-06 | End: 2026-06-06

## 2025-06-09 RX ORDER — ACETAMINOPHEN 500 MG
1000 TABLET ORAL EVERY 8 HOURS PRN
Status: DISCONTINUED | OUTPATIENT
Start: 2025-06-09 | End: 2025-06-10 | Stop reason: HOSPADM

## 2025-06-09 RX ORDER — MUPIROCIN 20 MG/G
OINTMENT TOPICAL 2 TIMES DAILY
Status: DISCONTINUED | OUTPATIENT
Start: 2025-06-09 | End: 2025-06-10 | Stop reason: HOSPADM

## 2025-06-09 RX ORDER — OXYCODONE HYDROCHLORIDE 5 MG/1
5 TABLET ORAL ONCE
Refills: 0 | Status: COMPLETED | OUTPATIENT
Start: 2025-06-09 | End: 2025-06-09

## 2025-06-09 RX ADMIN — AMLODIPINE BESYLATE 10 MG: 10 TABLET ORAL at 08:06

## 2025-06-09 RX ADMIN — Medication 6 MG: at 08:06

## 2025-06-09 RX ADMIN — ACETAMINOPHEN 1000 MG: 500 TABLET ORAL at 05:06

## 2025-06-09 RX ADMIN — OXYCODONE 5 MG: 5 TABLET ORAL at 02:06

## 2025-06-09 RX ADMIN — OXYCODONE 5 MG: 5 TABLET ORAL at 06:06

## 2025-06-09 RX ADMIN — ACETAMINOPHEN 1000 MG: 500 TABLET ORAL at 12:06

## 2025-06-09 RX ADMIN — MUPIROCIN: 20 OINTMENT TOPICAL at 08:06

## 2025-06-09 NOTE — ASSESSMENT & PLAN NOTE
Patient with fever overnight. No focal signs of infection at this time  - BC x2 pending  - HIV and hepatitis labs ordered  - viral panel done at outside hospital that was negative   - suspect underlying viral illness, unspecified  - continue to monitor

## 2025-06-09 NOTE — CARE UPDATE
Unit [facility] Care Support Interaction      I have reviewed the chart of Samantha Wang who is hospitalized for FUNMILAYO (acute kidney injury). The patient is currently located in the following unit: MSU        I have assisted the primary physician in management of the following:      MRSA Decolonization - Mupirocin ordered and CHG ordered    Radha Brock PA-C  [facility]

## 2025-06-09 NOTE — HOSPITAL COURSE
Patient admitted as transfer for FUNMILAYO, Cr has returned to baseline after hydration    Fever and chills overnight, blood cultures ordered, no growth and no return of symptoms. HIV and hepatitis panel negative. Suspect underlying viral illness that lead to presentation with dehydration and pre-renal FUNMILAYO    Patient has returned to baseline. Safe for discharge home. Needs close follow up with PCP for monitoring BMP and management of BP and CKD with possible referral to nephrology if warranted.      Plan of care reviewed with patient. In agreement with discharge plans      PE  No acute distress  Heart rrr  Lungs cta  Abdomen soft, bs present

## 2025-06-09 NOTE — ASSESSMENT & PLAN NOTE
Patient's blood pressure range in the last 24 hours was: BP  Min: 120/77  Max: 156/81.The patient's inpatient anti-hypertensive regimen is listed below:  Current Antihypertensives  , Daily, Oral  , Daily, Oral  amLODIPine tablet 10 mg, Daily, Oral    Plan  - BP is controlled, no changes needed to their regimen  - patient also on losartan-HCTZ 100-25mg  - can add on losartan as needed for elevated BP, would hold on HCTZ given current dehydration

## 2025-06-09 NOTE — PLAN OF CARE
06/09/25 1531   Post-Acute Status   Hospital Resources/Appts/Education Provided Appointment suggestion unavailable   Discharge Delays None known at this time   Discharge Plan   Discharge Plan A Home;Home with family   Discharge Plan B Home;Home with family     CM spoke with pt at beside LU updated due to FUNMILAYO. Discharge plan is home no needs. Will cont to coordinate care until discharge.   Discharge Plan A and Plan B have been determined by review of patient's clinical status, future medical and therapeutic needs, and coverage/benefits for post-acute care in coordination with multidisciplinary team members.     Taylor Hess, MSN   [facility]  [phone]

## 2025-06-09 NOTE — ASSESSMENT & PLAN NOTE
FUNMILAYO is likely due to pre-renal azotemia due to dehydration and vomiting. Baseline creatinine is ~2.7 in 2022, most recently Cr 1.6 on 6/5/25. Most recent creatinine and eGFR are listed below.  Recent Labs     06/07/25  1810 06/08/25  0934 06/09/25  0111   CREATININE 4.0* 2.8* 1.9*   EGFRNORACEVR 18* 28* 44*      Plan  - FUNMILAYO is stable  - Avoid nephrotoxins and renally dose meds for GFR listed above  - Monitor urine output, serial BMP, and adjust therapy as needed  -    - UA non-infectious appearing  - will continue IV hydration and encourage oral hydration  - prn antiemetics for nausea

## 2025-06-09 NOTE — PROGRESS NOTES
[facility] Medicine  Progress Note    Patient Name: Samantha Wang  MRN: [id number]  Patient Class: OP- Observation   Admission Date: 6/8/2025  Length of Stay: 0 days  Attending Physician: Luis Morales DO  Primary Care Provider: Renata, Primary Doctor        Subjective     Principal Problem:FUNMILAYO (acute kidney injury)        HPI:  Samantha Wang is a 45 y.o.M with no significant PMH. He presented with weakness, fever, malaise, nausea, vomiting, and abdominal pain. Patient works outside in the heat and has been trying to keep hydrated. He was seen on [facility] over the past two days after presenting with chest pain. Work up was largely negative and patient reported feeling improved and was discharged. He is concerned that something else may be going on. Tmax in the ER was 100. Work up with CT showed thickening of the urinary bladder. DDD causing stenosis of L5-S1.  Creatinine noted to be at 4. Cr at baseline 2.7 in 2022 and recently 1.6 on 6/5/25. Patient receiving IV hydration. Urine negative for UTI. Transferring for management of FUNMILAYO.     On arrival to [facility], patient states he has been feeling very week for the last two days with associated vomiting since Thursday. Patient states he hasn't vomited since yesterday and was able to keep some cereal down earlier this morning. He is also endorsing low back pain. 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 He tries to stay hydrated most of the time but admits it can be difficult. Currently denies fever, chills, chest pain, palpitations, SOB, cough, dysuria, headaches, or any other symptoms at this time.       Overview/Hospital Course:  Patient admitted as transfer for FUNMILAYO, Cr has returned to baseline after hydration    Fever and chills overnight, blood cultures ordered    Having dizziness and lightheadedness on standing and changes in position    Interval History: NAEO, resting in bed today. Was having some fever  and chills overnight. Feeling some better today but having some dizziness with changes in position.     Monitor blood cultures today, check hepatitis panel and HIV    Check orthostatic vitals today    Review of Systems   Constitutional:  Positive for chills, fatigue and fever.   Respiratory:  Negative for cough and shortness of breath.    Cardiovascular:  Negative for chest pain.   Gastrointestinal:  Negative for abdominal pain, constipation, diarrhea, nausea and vomiting.     Objective:     Vital Signs (Most Recent):  Temp: 100 °F (37.8 °C) (06/09/25 1141)  Pulse: 86 (06/09/25 1141)  Resp: 18 (06/09/25 1141)  BP: (!) 148/91 (06/09/25 1141)  SpO2: 99 % (06/09/25 1141) Vital Signs (24h Range):  Temp:  [98.2 °F (36.8 °C)-100.4 °F (38 °C)] 100 °F (37.8 °C)  Pulse:  [72-97] 86  Resp:  [16-20] 18  SpO2:  [91 %-100 %] 99 %  BP: (120-156)/(59-91) 148/91        There is no height or weight on file to calculate BMI.    Intake/Output Summary (Last 24 hours) at 6/9/2025 1306  Last data filed at 6/9/2025 0600  Gross per 24 hour   Intake 120 ml   Output 500 ml   Net -380 ml         Physical Exam  HENT:      Head: Normocephalic and atraumatic.   Cardiovascular:      Rate and Rhythm: Normal rate.      Pulses: Normal pulses.      Heart sounds: No murmur heard.  Pulmonary:      Effort: Pulmonary effort is normal. No respiratory distress.   Abdominal:      General: Abdomen is flat. Bowel sounds are normal. There is no distension.   Skin:     General: Skin is warm and dry.   Neurological:      General: No focal deficit present.      Mental Status: He is alert and oriented to person, place, and time.               Significant Labs: All pertinent labs within the past 24 hours have been reviewed.    Significant Imaging: I have reviewed all pertinent imaging results/findings within the past 24 hours.      Assessment & Plan  FUNMILAYO (acute kidney injury)  FUNMILAYO is likely due to pre-renal azotemia due to dehydration and vomiting. Baseline  creatinine is ~2.7 in 2022, most recently Cr 1.6 on 6/5/25. Most recent creatinine and eGFR are listed below.  Recent Labs     06/07/25  1810 06/08/25  0934 06/09/25  0111   CREATININE 4.0* 2.8* 1.9*   EGFRNORACEVR 18* 28* 44*      Plan  - FUNMILAYO is stable  - Avoid nephrotoxins and renally dose meds for GFR listed above  - Monitor urine output, serial BMP, and adjust therapy as needed  -    - UA non-infectious appearing  - will continue IV hydration and encourage oral hydration  - prn antiemetics for nausea  Hypertension  Patient's blood pressure range in the last 24 hours was: BP  Min: 120/77  Max: 156/81.The patient's inpatient anti-hypertensive regimen is listed below:  Current Antihypertensives  , Daily, Oral  , Daily, Oral  amLODIPine tablet 10 mg, Daily, Oral    Plan  - BP is controlled, no changes needed to their regimen  - patient also on losartan-HCTZ 100-25mg  - can add on losartan as needed for elevated BP, would hold on HCTZ given current dehydration      Fever  Patient with fever overnight. No focal signs of infection at this time  - BC x2 pending  - HIV and hepatitis labs ordered  - viral panel done at outside hospital that was negative   - suspect underlying viral illness, unspecified  - continue to monitor    Dizziness  - Dizziness still today, present when changing position  - orthostatic vitals ordered for today    VTE Risk Mitigation (From admission, onward)           Ordered     IP VTE LOW RISK PATIENT  Once         06/08/25 0829     Place sequential compression device  Until discontinued         06/08/25 0829                    Discharge Planning   LU: 6/10/2025     Code Status: Full Code   Medical Readiness for Discharge Date:   Discharge Plan A: Home, Home with family                        Luis Morales DO  Department of Hospital Medicine   [facility]

## 2025-06-09 NOTE — PLAN OF CARE
06/09/25 1027   Rounds   Attendance Provider;Nurse ;;Assigned nurse   Discharge Plan A Home;Home with family   Why the patient remains in the hospital Requires continued medical care   Transition of Care Barriers None     Worsening FUNMILAYO , fever and chills reported updated LU 6/10.    Taylor Hess, MSN   [facility]  [phone]

## 2025-06-09 NOTE — SUBJECTIVE & OBJECTIVE
Interval History: NAEO, resting in bed today. Was having some fever and chills overnight. Feeling some better today but having some dizziness with changes in position.     Monitor blood cultures today, check hepatitis panel and HIV    Check orthostatic vitals today    Review of Systems   Constitutional:  Positive for chills, fatigue and fever.   Respiratory:  Negative for cough and shortness of breath.    Cardiovascular:  Negative for chest pain.   Gastrointestinal:  Negative for abdominal pain, constipation, diarrhea, nausea and vomiting.     Objective:     Vital Signs (Most Recent):  Temp: 100 °F (37.8 °C) (06/09/25 1141)  Pulse: 86 (06/09/25 1141)  Resp: 18 (06/09/25 1141)  BP: (!) 148/91 (06/09/25 1141)  SpO2: 99 % (06/09/25 1141) Vital Signs (24h Range):  Temp:  [98.2 °F (36.8 °C)-100.4 °F (38 °C)] 100 °F (37.8 °C)  Pulse:  [72-97] 86  Resp:  [16-20] 18  SpO2:  [91 %-100 %] 99 %  BP: (120-156)/(59-91) 148/91        There is no height or weight on file to calculate BMI.    Intake/Output Summary (Last 24 hours) at 6/9/2025 1306  Last data filed at 6/9/2025 0600  Gross per 24 hour   Intake 120 ml   Output 500 ml   Net -380 ml         Physical Exam  HENT:      Head: Normocephalic and atraumatic.   Cardiovascular:      Rate and Rhythm: Normal rate.      Pulses: Normal pulses.      Heart sounds: No murmur heard.  Pulmonary:      Effort: Pulmonary effort is normal. No respiratory distress.   Abdominal:      General: Abdomen is flat. Bowel sounds are normal. There is no distension.   Skin:     General: Skin is warm and dry.   Neurological:      General: No focal deficit present.      Mental Status: He is alert and oriented to person, place, and time.               Significant Labs: All pertinent labs within the past 24 hours have been reviewed.    Significant Imaging: I have reviewed all pertinent imaging results/findings within the past 24 hours.

## 2025-06-10 VITALS
SYSTOLIC BLOOD PRESSURE: 126 MMHG | RESPIRATION RATE: 18 BRPM | DIASTOLIC BLOOD PRESSURE: 76 MMHG | TEMPERATURE: 98 F | OXYGEN SATURATION: 96 % | HEART RATE: 68 BPM

## 2025-06-10 LAB
ABSOLUTE EOSINOPHIL (OHS): 0.03 K/UL
ABSOLUTE MONOCYTE (OHS): 0.77 K/UL (ref 0.3–1)
ABSOLUTE NEUTROPHIL COUNT (OHS): 1.3 K/UL (ref 1.8–7.7)
ALBUMIN SERPL BCP-MCNC: 3.9 G/DL (ref 3.5–5.2)
ALP SERPL-CCNC: 63 UNIT/L (ref 40–150)
ALT SERPL W/O P-5'-P-CCNC: 18 UNIT/L (ref 10–44)
ANION GAP (OHS): 15 MMOL/L (ref 8–16)
AST SERPL-CCNC: 19 UNIT/L (ref 11–45)
BASOPHILS # BLD AUTO: 0.02 K/UL
BASOPHILS NFR BLD AUTO: 0.6 %
BILIRUB SERPL-MCNC: 0.5 MG/DL (ref 0.1–1)
BUN SERPL-MCNC: 22 MG/DL (ref 6–20)
CALCIUM SERPL-MCNC: 8.9 MG/DL (ref 8.7–10.5)
CHLORIDE SERPL-SCNC: 93 MMOL/L (ref 95–110)
CO2 SERPL-SCNC: 26 MMOL/L (ref 23–29)
CREAT SERPL-MCNC: 1.5 MG/DL (ref 0.5–1.4)
ERYTHROCYTE [DISTWIDTH] IN BLOOD BY AUTOMATED COUNT: 14.6 % (ref 11.5–14.5)
GFR SERPLBLD CREATININE-BSD FMLA CKD-EPI: 59 ML/MIN/1.73/M2
GLUCOSE SERPL-MCNC: 83 MG/DL (ref 70–110)
HCT VFR BLD AUTO: 48 % (ref 40–54)
HGB BLD-MCNC: 15.5 GM/DL (ref 14–18)
IMM GRANULOCYTES # BLD AUTO: 0.01 K/UL (ref 0–0.04)
IMM GRANULOCYTES NFR BLD AUTO: 0.3 % (ref 0–0.5)
LYMPHOCYTES # BLD AUTO: 1.46 K/UL (ref 1–4.8)
MAGNESIUM SERPL-MCNC: 2.2 MG/DL (ref 1.6–2.6)
MCH RBC QN AUTO: 25.7 PG (ref 27–31)
MCHC RBC AUTO-ENTMCNC: 32.3 G/DL (ref 32–36)
MCV RBC AUTO: 80 FL (ref 82–98)
NUCLEATED RBC (/100WBC) (OHS): 0 /100 WBC
PHOSPHATE SERPL-MCNC: 4.5 MG/DL (ref 2.7–4.5)
PLATELET # BLD AUTO: 201 K/UL (ref 150–450)
PMV BLD AUTO: 10.9 FL (ref 9.2–12.9)
POTASSIUM SERPL-SCNC: 3.9 MMOL/L (ref 3.5–5.1)
PROT SERPL-MCNC: 7.8 GM/DL (ref 6–8.4)
RBC # BLD AUTO: 6.03 M/UL (ref 4.6–6.2)
RELATIVE EOSINOPHIL (OHS): 0.8 %
RELATIVE LYMPHOCYTE (OHS): 40.7 % (ref 18–48)
RELATIVE MONOCYTE (OHS): 21.4 % (ref 4–15)
RELATIVE NEUTROPHIL (OHS): 36.2 % (ref 38–73)
SODIUM SERPL-SCNC: 134 MMOL/L (ref 136–145)
WBC # BLD AUTO: 3.59 K/UL (ref 3.9–12.7)

## 2025-06-10 PROCEDURE — 80053 COMPREHEN METABOLIC PANEL: CPT | Performed by: HOSPITALIST

## 2025-06-10 PROCEDURE — 85025 COMPLETE CBC W/AUTO DIFF WBC: CPT | Performed by: HOSPITALIST

## 2025-06-10 PROCEDURE — 83735 ASSAY OF MAGNESIUM: CPT | Performed by: HOSPITALIST

## 2025-06-10 PROCEDURE — 25000003 PHARM REV CODE 250

## 2025-06-10 PROCEDURE — 36415 COLL VENOUS BLD VENIPUNCTURE: CPT | Performed by: HOSPITALIST

## 2025-06-10 PROCEDURE — G0378 HOSPITAL OBSERVATION PER HR: HCPCS

## 2025-06-10 PROCEDURE — 84100 ASSAY OF PHOSPHORUS: CPT | Performed by: HOSPITALIST

## 2025-06-10 RX ADMIN — OXYCODONE 5 MG: 5 TABLET ORAL at 05:06

## 2025-06-10 RX ADMIN — AMLODIPINE BESYLATE 10 MG: 10 TABLET ORAL at 08:06

## 2025-06-10 NOTE — PLAN OF CARE
[facility] - Med Surg  Discharge Final Note    Primary Care Provider: No, Primary Doctor    Expected Discharge Date: 6/10/2025    Final Discharge Note (most recent)       Final Note - 06/10/25 1210          Final Note    Assessment Type Final Discharge Note (P)      Anticipated Discharge Disposition Home or Self Care (P)      Hospital Resources/Appts/Education Provided Appointments scheduled and added to AVS (P)         Post-Acute Status    Discharge Delays None known at this time (P)                      Important Message from Medicare             Contact Info       No, Primary Doctor   Relationship: PCP - General        Next Steps: Follow up in 1 week(s)    [facility]    [address] [place] [address]  [phone]       Next Steps: Go on 6/13/2025    Instructions: Hospital follow up at 2:45 pm            Pt discharged home with family, follow up appt added to AVS. Family provided ride home.  No further needs expressed or identified.    Discharge Plan A and Plan B have been determined by review of patient's clinical status, future medical and therapeutic needs, and coverage/benefits for post-acute care in coordination with multidisciplinary team members.     Taylor Hess, MSN   [facility]  [phone]

## 2025-06-10 NOTE — PLAN OF CARE
06/09/25 1027 06/10/25 1027   Rounds   Attendance  --  Provider;Nurse ;;Assigned nurse   Discharge Plan A  --  Home;Home with family   Why the patient remains in the hospital  --  Requires continued medical care   Transition of Care Barriers  --  None     Pt is discharging home today with follow up appts.    Taylor Hess, MSN   [facility]  [phone]

## 2025-06-10 NOTE — PLAN OF CARE
Introduced as VN and will be reviewing discharge instructions.   Reviewed AVS with patient including education on FUNMILAYO, medications, hypertension, Low salt diet. Patient verbalized understanding. Educated patient on reason for admission, home medication list, and discharge instructions including when to return to ED and the following doctor appointments.  Education per flowsheet.  Opportunity given for questions and questions answered.  Nurse  notified of   completion of discharge education. Patient waiting for ride to arrive

## 2025-06-10 NOTE — PLAN OF CARE
Hospital Follow Up with  [facility]  Friday Jun 13, 2025 Hospital follow up at 2:45 pm   No

## 2025-06-10 NOTE — PLAN OF CARE
06/10/2025      Samantha Wang  [address] [id number]  [place] [address]          Hospital Medicine Dept.  [facility] [address] [address] [phone] after hours  [phone] fax Samantha Wang has been hospitalized at the [facility] since 6/8/2025.  Please excuse the patient from duties from 6/5/2025 to 6/12/2025.  Patient may return on 6/13/2025.  No restrictions.     Please contact me if you have any questions.                  __________________________  Luis Morales,   06/10/2025

## 2025-06-11 NOTE — ASSESSMENT & PLAN NOTE
Patient's blood pressure range in the last 24 hours was: BP  Min: 125/76  Max: 136/87.The patient's inpatient anti-hypertensive regimen is listed below:  Current Antihypertensives  , Daily, Oral  , Daily, Oral    Plan  - BP is controlled, no changes needed to their regimen  - patient also on losartan-HCTZ 100-25mg  - can add on losartan as needed for elevated BP, would hold on HCTZ given current dehydration

## 2025-06-11 NOTE — ASSESSMENT & PLAN NOTE
FUNMILAYO is likely due to pre-renal azotemia due to dehydration and vomiting. Baseline creatinine is ~2.7 in 2022, most recently Cr 1.6 on 6/5/25. Most recent creatinine and eGFR are listed below.  Recent Labs     06/08/25  0934 06/09/25  0111 06/10/25  0447   CREATININE 2.8* 1.9* 1.5*   EGFRNORACEVR 28* 44* 59*      Plan  - FUNMILAYO is stable  - Avoid nephrotoxins and renally dose meds for GFR listed above  - Monitor urine output, serial BMP, and adjust therapy as needed  -    - UA non-infectious appearing  - will continue IV hydration and encourage oral hydration  - prn antiemetics for nausea

## 2025-06-11 NOTE — DISCHARGE SUMMARY
[facility] Medicine  Discharge Summary      Patient Name: Samantha Wang  MRN: [id number]  SEAN: [id number]  Patient Class: OP- Observation  Admission Date: 6/8/2025  Hospital Length of Stay: 0 days  Discharge Date and Time: 06/10/2025 7:09 PM  Attending Physician: Renata att. providers found   Discharging Provider: uLis Morales DO  Primary Care Provider: Renata, Primary Doctor  [facility] Medicine Team: [facility] S Luis Morales DO  Primary Care Team: [facility]    HPI:   Samantha Wang is a 45 y.o.M with no significant PMH. He presented with weakness, fever, malaise, nausea, vomiting, and abdominal pain. Patient works outside in the heat and has been trying to keep hydrated. He was seen on [facility] over the past two days after presenting with chest pain. Work up was largely negative and patient reported feeling improved and was discharged. He is concerned that something else may be going on. Tmax in the ER was 100. Work up with CT showed thickening of the urinary bladder. DDD causing stenosis of L5-S1.  Creatinine noted to be at 4. Cr at baseline 2.7 in 2022 and recently 1.6 on 6/5/25. Patient receiving IV hydration. Urine negative for UTI. Transferring for management of FUNMILAYO.     On arrival to [facility], patient states he has been feeling very week for the last two days with associated vomiting since Thursday. Patient states he hasn't vomited since yesterday and was able to keep some cereal down earlier this morning. He is also endorsing low back pain. He reports he works for the [employer] and [employer] and works outside most of the time. He tries to stay hydrated most of the time but admits it can be difficult. Currently denies fever, chills, chest pain, palpitations, SOB, cough, dysuria, headaches, or any other symptoms at this time.       * No surgery found *      Hospital Course:   Patient admitted as transfer for FUNMILAYO, Cr has returned to baseline after hydration    Fever and chills overnight,  blood cultures ordered, no growth and no return of symptoms. HIV and hepatitis panel negative. Suspect underlying viral illness that lead to presentation with dehydration and pre-renal FUNMILAYO    Patient has returned to baseline. Safe for discharge home. Needs close follow up with PCP for monitoring BMP and management of BP and CKD with possible referral to nephrology if warranted.      Plan of care reviewed with patient. In agreement with discharge plans      PE  No acute distress  Heart rrr  Lungs cta  Abdomen soft, bs present       Goals of Care Treatment Preferences:  Code Status: Full Code         Consults:     Assessment & Plan  FUNMILAYO (acute kidney injury)  FUNMILAYO is likely due to pre-renal azotemia due to dehydration and vomiting. Baseline creatinine is ~2.7 in 2022, most recently Cr 1.6 on 6/5/25. Most recent creatinine and eGFR are listed below.  Recent Labs     06/08/25  0934 06/09/25  0111 06/10/25  0447   CREATININE 2.8* 1.9* 1.5*   EGFRNORACEVR 28* 44* 59*      Plan  - FUNMILAYO is stable  - Avoid nephrotoxins and renally dose meds for GFR listed above  - Monitor urine output, serial BMP, and adjust therapy as needed  -    - UA non-infectious appearing  - will continue IV hydration and encourage oral hydration  - prn antiemetics for nausea  Hypertension  Patient's blood pressure range in the last 24 hours was: BP  Min: 125/76  Max: 136/87.The patient's inpatient anti-hypertensive regimen is listed below:  Current Antihypertensives  , Daily, Oral  , Daily, Oral    Plan  - BP is controlled, no changes needed to their regimen  - patient also on losartan-HCTZ 100-25mg  - can add on losartan as needed for elevated BP, would hold on HCTZ given current dehydration      Fever  Patient with fever overnight. No focal signs of infection at this time  - BC x2 pending  - HIV and hepatitis labs ordered  - viral panel done at outside hospital that was negative   - suspect underlying viral illness, unspecified  - continue to  monitor    Dizziness  - Dizziness still today, present when changing position  - orthostatic vitals ordered for today    Final Active Diagnoses:    Diagnosis Date Noted POA    PRINCIPAL PROBLEM:  FUNMILAYO (acute kidney injury) [N17.9] 06/08/2025 Yes    Fever [R50.9] 06/09/2025 No    Dizziness [R42] 06/09/2025 Yes    Hypertension [I10] 06/08/2025 Yes      Problems Resolved During this Admission:       Discharged Condition: good    Disposition: Home or Self Care    Follow Up:   Follow-up Information       No, Primary Doctor Follow up in 1 week(s).               [facility]. Go on 6/13/2025.    Why: Hospital follow up at 2:45 pm  Contact information:  [address] [place] [address] [phone]                         Patient Instructions:   No discharge procedures on file.    Significant Diagnostic Studies: Labs: CMP   Recent Labs   Lab 06/09/25  0111 06/10/25  0447   * 134*   K 3.9 3.9   CL 98 93*   CO2 23 26    83   BUN 27* 22*   CREATININE 1.9* 1.5*   CALCIUM 9.3 8.9   PROT 7.8 7.8   ALBUMIN 3.9 3.9   BILITOT 0.5 0.5   ALKPHOS 64 63   AST 25 19   ALT 19 18   ANIONGAP 14 15    and CBC   Recent Labs   Lab 06/09/25  0111 06/10/25  0447   WBC 4.85 3.59*   HGB 14.1 15.5   HCT 42.7 48.0    201       Pending Diagnostic Studies:       None           Medications:  Reconciled Home Medications:      Medication List        PAUSE taking these medications      ketorolac 10 mg tablet  Wait to take this until your doctor or other care provider tells you to start again.  Commonly known as: TORADOL  Take 1 tablet (10 mg total) by mouth every 6 (six) hours as needed for Pain.     naproxen 500 MG tablet  Wait to take this until your doctor or other care provider tells you to start again.  Commonly known as: NAPROSYN  Take 500 mg by mouth 2 (two) times daily.            CONTINUE taking these medications      ALPRAZolam 0.5 MG tablet  Commonly known as: XANAX  Take 1 tablet (0.5 mg total) by mouth 3  (three) times daily as needed for Anxiety.     amLODIPine 10 MG tablet  Commonly known as: NORVASC  Take 10 mg by mouth once daily.     LIDOcaine 5 %  Commonly known as: LIDODERM  Place 1 patch onto the skin once daily. Remove & Discard patch within 12 hours or as directed by MD     losartan-hydrochlorothiazide 100-25 mg 100-25 mg per tablet  Commonly known as: HYZAAR  Take 1 tablet by mouth once daily.     pantoprazole 20 MG tablet  Commonly known as: PROTONIX  Take 20 mg by mouth every morning.              Indwelling Lines/Drains at time of discharge:   Lines/Drains/Airways       None                   Time spent on the discharge of patient: 48 minutes         Luis Morales DO  Department of Hospital Medicine  [facility]

## 2025-06-14 LAB
BACTERIA BLD CULT: NORMAL
BACTERIA BLD CULT: NORMAL